# Patient Record
Sex: FEMALE | Race: BLACK OR AFRICAN AMERICAN
[De-identification: names, ages, dates, MRNs, and addresses within clinical notes are randomized per-mention and may not be internally consistent; named-entity substitution may affect disease eponyms.]

---

## 2021-01-14 PROBLEM — Z00.00 ENCOUNTER FOR PREVENTIVE HEALTH EXAMINATION: Status: ACTIVE | Noted: 2021-01-14

## 2021-01-15 ENCOUNTER — NON-APPOINTMENT (OUTPATIENT)
Age: 61
End: 2021-01-15

## 2021-01-15 ENCOUNTER — APPOINTMENT (OUTPATIENT)
Dept: CARDIOLOGY | Facility: CLINIC | Age: 61
End: 2021-01-15
Payer: COMMERCIAL

## 2021-01-15 VITALS
TEMPERATURE: 97.2 F | WEIGHT: 151 LBS | OXYGEN SATURATION: 99 % | BODY MASS INDEX: 26.75 KG/M2 | HEART RATE: 73 BPM | DIASTOLIC BLOOD PRESSURE: 80 MMHG | HEIGHT: 63 IN | SYSTOLIC BLOOD PRESSURE: 130 MMHG

## 2021-01-15 DIAGNOSIS — R06.00 DYSPNEA, UNSPECIFIED: ICD-10-CM

## 2021-01-15 PROCEDURE — 99072 ADDL SUPL MATRL&STAF TM PHE: CPT

## 2021-01-15 PROCEDURE — 99243 OFF/OP CNSLTJ NEW/EST LOW 30: CPT

## 2021-01-15 PROCEDURE — 93000 ELECTROCARDIOGRAM COMPLETE: CPT

## 2021-01-15 NOTE — PHYSICAL EXAM
[General Appearance - Well Developed] : well developed [Normal Appearance] : normal appearance [Well Groomed] : well groomed [General Appearance - Well Nourished] : well nourished [No Deformities] : no deformities [General Appearance - In No Acute Distress] : no acute distress [Normal Conjunctiva] : the conjunctiva exhibited no abnormalities [Eyelids - No Xanthelasma] : the eyelids demonstrated no xanthelasmas [Normal Oral Mucosa] : normal oral mucosa [No Oral Pallor] : no oral pallor [No Oral Cyanosis] : no oral cyanosis [Normal Jugular Venous A Waves Present] : normal jugular venous A waves present [Normal Jugular Venous V Waves Present] : normal jugular venous V waves present [No Jugular Venous Pettit A Waves] : no jugular venous pettit A waves [Heart Rate And Rhythm] : heart rate and rhythm were normal [Heart Sounds] : normal S1 and S2 [Murmurs] : no murmurs present [Respiration, Rhythm And Depth] : normal respiratory rhythm and effort [Exaggerated Use Of Accessory Muscles For Inspiration] : no accessory muscle use [Auscultation Breath Sounds / Voice Sounds] : lungs were clear to auscultation bilaterally [Abdomen Tenderness] : non-tender [Abdomen Soft] : soft [Abdomen Mass (___ Cm)] : no abdominal mass palpated [Gait - Sufficient For Exercise Testing] : the gait was sufficient for exercise testing [Abnormal Walk] : normal gait [Nail Clubbing] : no clubbing of the fingernails [Cyanosis, Localized] : no localized cyanosis [Petechial Hemorrhages (___cm)] : no petechial hemorrhages [Skin Color & Pigmentation] : normal skin color and pigmentation [] : no rash [No Venous Stasis] : no venous stasis [Skin Lesions] : no skin lesions [No Skin Ulcers] : no skin ulcer [No Xanthoma] : no  xanthoma was observed [Oriented To Time, Place, And Person] : oriented to person, place, and time [Affect] : the affect was normal [Mood] : the mood was normal [No Anxiety] : not feeling anxious

## 2021-01-15 NOTE — ASSESSMENT
[FreeTextEntry1] : Patient appears to be doing fairly well from a cardiac standpoint.  Blood pressures are adequately controlled.  History of right lower extremity edema and appears dependent most likely venous insufficiency.  Advised leg elevation and compression socks as needed.  Continue current medical regimen.  We will try to obtain records from her primary care physician's office and try to clarify reason for referral.  If she has had recent stress testing, see no indication for more aggressive testing.  Patient referred back to primary care physician for routine follow-up.

## 2021-07-20 ENCOUNTER — NON-APPOINTMENT (OUTPATIENT)
Age: 61
End: 2021-07-20

## 2021-07-20 ENCOUNTER — APPOINTMENT (OUTPATIENT)
Dept: CARDIOLOGY | Facility: CLINIC | Age: 61
End: 2021-07-20
Payer: COMMERCIAL

## 2021-07-20 VITALS
SYSTOLIC BLOOD PRESSURE: 142 MMHG | OXYGEN SATURATION: 99 % | HEIGHT: 63 IN | HEART RATE: 73 BPM | WEIGHT: 152 LBS | DIASTOLIC BLOOD PRESSURE: 90 MMHG | BODY MASS INDEX: 26.93 KG/M2 | TEMPERATURE: 98 F

## 2021-07-20 PROCEDURE — 99215 OFFICE O/P EST HI 40 MIN: CPT

## 2021-07-20 PROCEDURE — 99072 ADDL SUPL MATRL&STAF TM PHE: CPT

## 2021-07-20 PROCEDURE — 93000 ELECTROCARDIOGRAM COMPLETE: CPT

## 2021-07-20 RX ORDER — AMLODIPINE BESYLATE AND BENAZEPRIL HYDROCHLORIDE 10; 20 MG/1; MG/1
10-20 CAPSULE ORAL
Qty: 90 | Refills: 2 | Status: DISCONTINUED | COMMUNITY
End: 2021-07-20

## 2021-07-20 NOTE — HISTORY OF PRESENT ILLNESS
[FreeTextEntry1] : 61-year-old female last seen in January for prescribed c/o "dyspnea on exertion ", which she denied.\par \par Patient has longstanding history of hypertension for 25+ years.  She has recently been diagnosed diabetes, currently diet controlled.  Denies any history of prior cardiac disease.  No history of hyperlipidemia.  Nondrinker non-smoker.  Suffers from an element of whitecoat hypertension and states that her blood pressures are generally higher when she visits her primary care physician's office.  Self measured blood pressures are generally in the 130s.\par \par She states that she has no significant dyspnea on exertion.  She exercises on a treadmill regularly and has had no chest pain, palpitations or syncopal episodes.  \par \par Patient had a stress echocardiogram done 7/15/2021 at her primary care physician's office.  The study appeared grossly normal with normal exercise tolerance, normal resting blood pressure, normal peak exercise blood pressure, achieved 94% MPHR, equivocal stress EKG changes and no regional wall motion abnormalities with normal augmentation and response to exercise stress.  Nevertheless, patient states that ordering physician requested that she start metoprolol and be scheduled for nuclear stress test follow-up.

## 2021-07-20 NOTE — DISCUSSION/SUMMARY
[FreeTextEntry1] : 61-year-old female longstanding hypertensive with elevated blood pressures (some element of whitecoat hypertension likely as well.  Self-reported blood pressures generally in the 130s as per patient.  Her baseline heart rate was in the 60s prior to his stress testing, and patient may not tolerate the addition of further negative chronotropic such as beta-blocker.  Would suggest maximizing her current combination of ACE inhibitor/calcium channel blocker prior to the addition of further blood pressure medications.  Patient told to continue logging her blood pressures and will review at next visit.\par \par Patient remains asymptomatic with negative stress echo-no evidence of any acute changes on her EKG.  See no indication for further diagnostic testing at this time.  This opinion is based solely on the printed report of the stress echo and may not reflect what was actually seen by the physician at the time that it was being done as the patient suspects that there was something on the stress EKG that is not in the report.\par \par We had a lengthy discussion regarding the need for continued exercise and dietary modification.  Labs drawn at PCPs office were still pending review.  Follow-up in 3 months for reevaluation of blood pressures at that time.

## 2021-11-12 ENCOUNTER — NON-APPOINTMENT (OUTPATIENT)
Age: 61
End: 2021-11-12

## 2021-11-12 ENCOUNTER — APPOINTMENT (OUTPATIENT)
Dept: CARDIOLOGY | Facility: CLINIC | Age: 61
End: 2021-11-12
Payer: COMMERCIAL

## 2021-11-12 VITALS
DIASTOLIC BLOOD PRESSURE: 74 MMHG | WEIGHT: 144 LBS | HEIGHT: 63 IN | OXYGEN SATURATION: 99 % | SYSTOLIC BLOOD PRESSURE: 124 MMHG | BODY MASS INDEX: 25.52 KG/M2 | HEART RATE: 94 BPM

## 2021-11-12 PROCEDURE — 99213 OFFICE O/P EST LOW 20 MIN: CPT

## 2021-11-12 PROCEDURE — 93000 ELECTROCARDIOGRAM COMPLETE: CPT

## 2021-11-12 NOTE — HISTORY OF PRESENT ILLNESS
[FreeTextEntry1] : 61-year-old female seen for  "dyspnea on exertion ", which she denied.\par \par Patient has longstanding history of hypertension for 25+ years.  She has recently been diagnosed diabetes, currently diet controlled.  Denies any history of prior cardiac disease.  No history of hyperlipidemia.  Nondrinker non-smoker.  \par \mary Likely suffers from an element of white coat hypertension and states that her blood pressures are generally higher when she visits her primary care physician's office.  Self measured blood pressures were reviewed and were generally in the 120/80 range.\par \par She states that she has no significant dyspnea on exertion.  She exercises on a treadmill regularly and has had no chest pain, palpitations or syncopal episodes.  \par \par Patient had a stress echocardiogram done 7/15/2021 at her primary care physician's office.  The study appeared grossly normal with normal exercise tolerance, normal resting blood pressure, normal peak exercise blood pressure, achieved 94% MPHR, equivocal stress EKG changes and no regional wall motion abnormalities with normal augmentation and response to exercise stress.  \par

## 2021-11-12 NOTE — DISCUSSION/SUMMARY
[___ Month(s)] : in [unfilled] month(s) [FreeTextEntry1] : 61-year-old female longstanding hypertensive with elevated blood pressures (some element of whitecoat hypertension likely as well. BP's adequately controlled at present.  Patient told to continue logging her blood pressures and will review at next visit.\par \par We had a lengthy discussion regarding the need for continued exercise and dietary modification.  Labs drawn at PCPs , will request copy.  Follow-up in 6 months for reevaluation of blood pressures at that time.

## 2022-05-13 ENCOUNTER — APPOINTMENT (OUTPATIENT)
Dept: CARDIOLOGY | Facility: CLINIC | Age: 62
End: 2022-05-13

## 2023-01-13 ENCOUNTER — APPOINTMENT (OUTPATIENT)
Dept: CARDIOLOGY | Facility: CLINIC | Age: 63
End: 2023-01-13
Payer: COMMERCIAL

## 2023-01-13 ENCOUNTER — NON-APPOINTMENT (OUTPATIENT)
Age: 63
End: 2023-01-13

## 2023-01-13 VITALS
HEART RATE: 52 BPM | DIASTOLIC BLOOD PRESSURE: 70 MMHG | WEIGHT: 143 LBS | OXYGEN SATURATION: 97 % | BODY MASS INDEX: 25.34 KG/M2 | SYSTOLIC BLOOD PRESSURE: 120 MMHG | HEIGHT: 63 IN | TEMPERATURE: 97.9 F

## 2023-01-13 PROCEDURE — 93000 ELECTROCARDIOGRAM COMPLETE: CPT

## 2023-01-13 PROCEDURE — 99213 OFFICE O/P EST LOW 20 MIN: CPT | Mod: 25

## 2023-01-13 NOTE — HISTORY OF PRESENT ILLNESS
[FreeTextEntry1] : 62-year-old female with longstanding history of hypertension for 25+ years.  She has recently been diagnosed diabetes, diet controlled.  Denies any history of prior cardiac disease.  No history of hyperlipidemia.  Nondrinker non-smoker.  \par \par Some element of white coat effect and states that her blood pressures are generally higher when she visits her primary care physician's office.  Self measured blood pressures were reviewed and were generally in the 120/80 range.\par \par She states that she has no significant dyspnea on exertion.  She exercises on a treadmill regularly and has had no chest pain, palpitations or syncopal episodes.  \par \par Patient had a stress echocardiogram done 7/15/2021 at her primary care physician's office.  The study appeared grossly normal with normal exercise tolerance, normal resting blood pressure, normal peak exercise blood pressure, achieved 94% MPHR, equivocal stress EKG changes and no regional wall motion abnormalities with normal augmentation and response to exercise stress.  \par

## 2023-01-13 NOTE — DISCUSSION/SUMMARY
[___ Month(s)] : in [unfilled] month(s) [EKG obtained to assist in diagnosis and management of assessed problem(s)] : EKG obtained to assist in diagnosis and management of assessed problem(s) [FreeTextEntry1] : 62-year-old female longstanding hypertensive with elevated blood pressures (some element of whitecoat effect likely as well). BP's adequately controlled at present.  Patient told to continue logging her blood pressures and will review at next visit.\par \par We had a lengthy discussion regarding the need for continued exercise and dietary modification.  Labs drawn at [facility] , will request copy.  Follow-up in 1 year for reevaluation of blood pressures at that time.

## 2023-01-13 NOTE — CARDIOLOGY SUMMARY
[___] : [unfilled] [de-identified] : 1/13/23, Sinus  Rhythm \par -  Nonspecific T-abnormality. \par 11/12/21,  NSR\par

## 2024-01-03 NOTE — HISTORY OF PRESENT ILLNESS
[FreeTextEntry1] : 60-year-old female referred for "dyspnea on exertion ".\par \par Patient has longstanding history of hypertension for 25+ years.  She has recently been diagnosed diabetes, currently diet controlled.  Denies any history of prior cardiac disease.  No history of hyperlipidemia.  Nondrinker non-smoker.  Suffers from an element of whitecoat hypertension and states that her blood pressures are generally higher when she visits her primary care physician's office.  Self measured blood pressures are generally in the 130s.\par \par She states that she has no significant dyspnea on exertion.  She exercises on a treadmill regularly and has had no chest pain, palpitations or syncopal episodes.  Patient states she has serial stress testing done at her primary care physician's office but these results were not available for review.  Also, recent echo and carotids were not available for review.  Labs were reviewed.
Juan Antonio

## 2024-01-19 ENCOUNTER — APPOINTMENT (OUTPATIENT)
Dept: CARDIOLOGY | Facility: CLINIC | Age: 64
End: 2024-01-19
Payer: COMMERCIAL

## 2024-01-19 ENCOUNTER — NON-APPOINTMENT (OUTPATIENT)
Age: 64
End: 2024-01-19

## 2024-01-19 VITALS
OXYGEN SATURATION: 98 % | WEIGHT: 149 LBS | DIASTOLIC BLOOD PRESSURE: 80 MMHG | BODY MASS INDEX: 26.4 KG/M2 | SYSTOLIC BLOOD PRESSURE: 132 MMHG | HEART RATE: 69 BPM | HEIGHT: 63 IN

## 2024-01-19 DIAGNOSIS — I10 ESSENTIAL (PRIMARY) HYPERTENSION: ICD-10-CM

## 2024-01-19 DIAGNOSIS — E11.9 TYPE 2 DIABETES MELLITUS W/OUT COMPLICATIONS: ICD-10-CM

## 2024-01-19 PROCEDURE — 93000 ELECTROCARDIOGRAM COMPLETE: CPT

## 2024-01-19 PROCEDURE — 99213 OFFICE O/P EST LOW 20 MIN: CPT | Mod: 25

## 2024-01-19 NOTE — CARDIOLOGY SUMMARY
[___] : [unfilled] [de-identified] : 1/19/24., Sinus Rhythm , -consider old anterior infarct.-Nonspecific T-abnormality. 1/13/23, Sinus  Rhythm -  Nonspecific T-abnormality.  11/12/21,  NSR  [de-identified] : 7/15/21,

## 2024-01-19 NOTE — HISTORY OF PRESENT ILLNESS
[FreeTextEntry1] : 63-year-old female with longstanding history of hypertension for 25+ years.  She has recently been diagnosed diabetes, diet controlled.  Denies any history of prior cardiac disease.  No history of hyperlipidemia.  Nondrinker non-smoker.    Some element of white coat effect and states that her blood pressures are generally higher when she visits her primary care physician's office.  Self measured blood pressures were reviewed and were generally in the 120/80 range.  She states that she has no significant dyspnea on exertion.  Denies any chest pain, palpitations or syncopal episodes.    Patient had a stress echocardiogram done 7/15/2021 at her primary care physician's office.  The study appeared grossly normal with normal exercise tolerance, normal resting blood pressure, normal peak exercise blood pressure, achieved 94% MPHR, equivocal stress EKG changes and no regional wall motion abnormalities with normal augmentation and response to exercise stress.

## 2024-01-19 NOTE — DISCUSSION/SUMMARY
[___ Month(s)] : in [unfilled] month(s) [EKG obtained to assist in diagnosis and management of assessed problem(s)] : EKG obtained to assist in diagnosis and management of assessed problem(s) [FreeTextEntry1] : 63-year-old female longstanding hypertensive with elevated blood pressures (some element of whitecoat effect likely as well). BP's adequately controlled at present.  Patient told to continue logging her blood pressures and will review at next visit.  We had a lengthy discussion regarding the need for continued exercise and dietary modification.  Labs drawn at [facility]Ps , will request copy.  Follow-up in 1 year for reevaluation of blood pressures at that time.

## 2024-04-06 ENCOUNTER — RX RENEWAL (OUTPATIENT)
Age: 64
End: 2024-04-06

## 2024-04-06 RX ORDER — AMLODIPINE BESYLATE AND BENAZEPRIL HYDROCHLORIDE 10; 40 MG/1; MG/1
10-40 CAPSULE ORAL
Qty: 90 | Refills: 3 | Status: ACTIVE | COMMUNITY
Start: 2021-07-20 | End: 1900-01-01

## 2025-01-24 ENCOUNTER — APPOINTMENT (OUTPATIENT)
Dept: CARDIOLOGY | Facility: CLINIC | Age: 65
End: 2025-01-24

## 2025-02-21 ENCOUNTER — NON-APPOINTMENT (OUTPATIENT)
Age: 65
End: 2025-02-21

## 2025-02-21 ENCOUNTER — APPOINTMENT (OUTPATIENT)
Dept: CARDIOLOGY | Facility: CLINIC | Age: 65
End: 2025-02-21
Payer: COMMERCIAL

## 2025-02-21 VITALS
SYSTOLIC BLOOD PRESSURE: 120 MMHG | HEIGHT: 63 IN | OXYGEN SATURATION: 98 % | DIASTOLIC BLOOD PRESSURE: 70 MMHG | BODY MASS INDEX: 24.8 KG/M2 | WEIGHT: 140 LBS | HEART RATE: 60 BPM

## 2025-02-21 PROCEDURE — G2211 COMPLEX E/M VISIT ADD ON: CPT | Mod: NC

## 2025-02-21 PROCEDURE — 99213 OFFICE O/P EST LOW 20 MIN: CPT
